# Patient Record
Sex: MALE | Race: WHITE | NOT HISPANIC OR LATINO | Employment: FULL TIME | RURAL
[De-identification: names, ages, dates, MRNs, and addresses within clinical notes are randomized per-mention and may not be internally consistent; named-entity substitution may affect disease eponyms.]

---

## 2023-03-24 ENCOUNTER — OFFICE VISIT (OUTPATIENT)
Dept: OTOLARYNGOLOGY | Facility: CLINIC | Age: 38
End: 2023-03-24
Payer: OTHER GOVERNMENT

## 2023-03-24 VITALS — HEIGHT: 69 IN | BODY MASS INDEX: 31.7 KG/M2 | WEIGHT: 214 LBS | OXYGEN SATURATION: 99 %

## 2023-03-24 DIAGNOSIS — J34.2 NASAL SEPTAL DEFORMITY: Primary | ICD-10-CM

## 2023-03-24 DIAGNOSIS — G47.33 OBSTRUCTIVE SLEEP APNEA: ICD-10-CM

## 2023-03-24 PROCEDURE — 99204 OFFICE O/P NEW MOD 45 MIN: CPT | Performed by: OTOLARYNGOLOGY

## 2023-03-24 PROCEDURE — 31231 NASAL ENDOSCOPY DX: CPT | Performed by: OTOLARYNGOLOGY

## 2023-03-24 RX ORDER — OMEPRAZOLE 40 MG/1
40 CAPSULE, DELAYED RELEASE ORAL DAILY
COMMUNITY

## 2023-03-24 RX ORDER — CELECOXIB 100 MG/1
100 CAPSULE ORAL 2 TIMES DAILY PRN
COMMUNITY

## 2023-03-24 RX ORDER — CETIRIZINE HYDROCHLORIDE 10 MG/1
10 TABLET ORAL DAILY
COMMUNITY

## 2023-03-27 NOTE — PROGRESS NOTES
Subjective   Nabeel Goodman is a 37 y.o. male.       History of Present Illness   Patient has a history of snoring and has been diagnosed with obstructive sleep apnea and placed on CPAP.  Was sent to me to evaluate for possible surgery on his nose.  He says he can breathe okay through his nose during the day.  Has some congestion at night but is able to use his CPAP.  Not a lot of rhinorrhea.      The following portions of the patient's history were reviewed and updated as appropriate: allergies, current medications, past family history, past medical history, past social history, past surgical history and problem list.     reports that he has quit smoking. His smoking use included cigarettes. He has never used smokeless tobacco.   Patient is not a tobacco user and has not been counseled for use of tobacco products      Review of Systems        Objective   Physical Exam  Ears: External ears no deformity, canals no discharge, tympanic membranes intact clear and mobile bilaterally.  Nares: Mild septal deformity to the left.  Mild nasal valve collapse on vigorous inspiration more notable on the right than the left  Oral cavity: Lips and gums without lesions.  Tongue and floor of mouth without lesions.  Parotid and submandibular ducts unobstructed.  No mucosal lesions on the buccal mucosa or vestibule of the mouth.  Pharynx: 2+ tonsils no erythema or exudate  Neck no adenopathy or mass  Nasal endoscopy is performed: Asim-Synephrine and Xylocaine are instilled the nares bilaterally.  0° scope is passed into each nostril.  The inferior, middle, and superior turbinates as well as nasal septum and nasopharynx are examined.  Pertinent findings include: Mild septal deformity to the left obstructing approximately 60% of the left-sided nasal airway with no evidence of pus or polyp in either naris and no mass in the nasopharynx         Assessment and Plan   Diagnoses and all orders for this visit:    1. Nasal septal deformity  (Primary)    2. Obstructive sleep apnea             Plan: Patient is asymptomatic during the day and is able to tolerate his CPAP at night therefore I do not believe surgery is indicated.  If he worsens and especially if he reaches a point where he cannot utilize his CPAP then would reconsider.